# Patient Record
Sex: FEMALE | Race: WHITE | NOT HISPANIC OR LATINO | Employment: FULL TIME | URBAN - METROPOLITAN AREA
[De-identification: names, ages, dates, MRNs, and addresses within clinical notes are randomized per-mention and may not be internally consistent; named-entity substitution may affect disease eponyms.]

---

## 2019-02-04 ENCOUNTER — OFFICE VISIT - HEALTHEAST (OUTPATIENT)
Dept: FAMILY MEDICINE | Facility: CLINIC | Age: 24
End: 2019-02-04

## 2019-02-04 DIAGNOSIS — Z30.09 ENCOUNTER FOR COUNSELING REGARDING CONTRACEPTION: ICD-10-CM

## 2019-02-04 DIAGNOSIS — H61.21 IMPACTED CERUMEN OF RIGHT EAR: ICD-10-CM

## 2019-02-04 DIAGNOSIS — J01.00 ACUTE NON-RECURRENT MAXILLARY SINUSITIS: ICD-10-CM

## 2019-02-04 DIAGNOSIS — H66.001 ACUTE SUPPURATIVE OTITIS MEDIA OF RIGHT EAR WITHOUT SPONTANEOUS RUPTURE OF TYMPANIC MEMBRANE, RECURRENCE NOT SPECIFIED: ICD-10-CM

## 2019-02-04 ASSESSMENT — MIFFLIN-ST. JEOR: SCORE: 1570.97

## 2019-02-05 LAB
C TRACH DNA SPEC QL PROBE+SIG AMP: NEGATIVE
N GONORRHOEA DNA SPEC QL NAA+PROBE: NEGATIVE

## 2019-02-06 ENCOUNTER — COMMUNICATION - HEALTHEAST (OUTPATIENT)
Dept: FAMILY MEDICINE | Facility: CLINIC | Age: 24
End: 2019-02-06

## 2019-02-19 ENCOUNTER — COMMUNICATION - HEALTHEAST (OUTPATIENT)
Dept: FAMILY MEDICINE | Facility: CLINIC | Age: 24
End: 2019-02-19

## 2019-02-25 ENCOUNTER — OFFICE VISIT - HEALTHEAST (OUTPATIENT)
Dept: FAMILY MEDICINE | Facility: CLINIC | Age: 24
End: 2019-02-25

## 2019-02-25 DIAGNOSIS — Z30.09 GENERAL COUNSELING FOR PRESCRIPTION OF ORAL CONTRACEPTIVES: ICD-10-CM

## 2019-02-25 DIAGNOSIS — Z12.4 SCREENING FOR MALIGNANT NEOPLASM OF CERVIX: ICD-10-CM

## 2019-02-25 DIAGNOSIS — Z30.430 ENCOUNTER FOR IUD INSERTION: ICD-10-CM

## 2019-02-25 LAB — HCG UR QL: NEGATIVE

## 2019-03-01 LAB
BKR LAB AP ABNORMAL BLEEDING: NO
BKR LAB AP BIRTH CONTROL/HORMONES: ABNORMAL
BKR LAB AP CERVICAL APPEARANCE: NORMAL
BKR LAB AP GYN ADEQUACY: ABNORMAL
BKR LAB AP GYN INTERPRETATION: ABNORMAL
BKR LAB AP HPV REFLEX: ABNORMAL
BKR LAB AP LMP: ABNORMAL
BKR LAB AP PATIENT STATUS: ABNORMAL
BKR LAB AP PREVIOUS ABNORMAL: ABNORMAL
BKR LAB AP PREVIOUS NORMAL: ABNORMAL
HIGH RISK?: NO
HPV SOURCE: ABNORMAL
HUMAN PAPILLOMA VIRUS 16 DNA: NEGATIVE
HUMAN PAPILLOMA VIRUS 18 DNA: NEGATIVE
HUMAN PAPILLOMA VIRUS FINAL DIAGNOSIS: ABNORMAL
HUMAN PAPILLOMA VIRUS OTHER HR: POSITIVE
INTERPRETING LAB: ABNORMAL
PATH REPORT.COMMENTS IMP SPEC: ABNORMAL
RESULT FLAG (HE HISTORICAL CONVERSION): ABNORMAL
SPECIMEN DESCRIPTION: ABNORMAL

## 2019-03-27 ENCOUNTER — COMMUNICATION - HEALTHEAST (OUTPATIENT)
Dept: FAMILY MEDICINE | Facility: CLINIC | Age: 24
End: 2019-03-27

## 2019-04-03 ENCOUNTER — OFFICE VISIT - HEALTHEAST (OUTPATIENT)
Dept: MIDWIFE SERVICES | Facility: CLINIC | Age: 24
End: 2019-04-03

## 2019-04-03 DIAGNOSIS — Z01.812 PRE-PROCEDURE LAB EXAM: ICD-10-CM

## 2019-04-03 DIAGNOSIS — Z30.017 NEXPLANON INSERTION: ICD-10-CM

## 2019-04-03 LAB — HCG UR QL: NEGATIVE

## 2019-04-03 ASSESSMENT — MIFFLIN-ST. JEOR: SCORE: 1569.61

## 2019-09-16 ENCOUNTER — OFFICE VISIT - HEALTHEAST (OUTPATIENT)
Dept: MIDWIFE SERVICES | Facility: CLINIC | Age: 24
End: 2019-09-16

## 2019-09-16 DIAGNOSIS — Z97.5 NEXPLANON IN PLACE: ICD-10-CM

## 2019-09-16 DIAGNOSIS — N92.1 BREAKTHROUGH BLEEDING ON NEXPLANON: ICD-10-CM

## 2019-09-16 DIAGNOSIS — Z97.5 BREAKTHROUGH BLEEDING ON NEXPLANON: ICD-10-CM

## 2019-09-16 ASSESSMENT — MIFFLIN-ST. JEOR: SCORE: 1546.93

## 2020-08-21 ENCOUNTER — COMMUNICATION - HEALTHEAST (OUTPATIENT)
Dept: FAMILY MEDICINE | Facility: CLINIC | Age: 25
End: 2020-08-21

## 2021-05-25 ENCOUNTER — RECORDS - HEALTHEAST (OUTPATIENT)
Dept: ADMINISTRATIVE | Facility: CLINIC | Age: 26
End: 2021-05-25

## 2021-05-27 NOTE — TELEPHONE ENCOUNTER
New Appointment Needed  What is the reason for the visit:    nexplanon insertion  Provider Preference: Dr Amada Euceda  How soon do you need to be seen?: patient is home from college and only has availability on Friday 3-29-19  Waitlist offered?: No  Okay to leave a detailed message:  Yes, it is okay to leave a detailed message with appointment time if able to be seen on Friday.

## 2021-05-27 NOTE — PATIENT INSTRUCTIONS - HE
Patient Education   If you have spotting, you can try ibuprofen 600 mg x5 days (or days you have spotting or bleeding)    Expect spotting or irregular bleeding for the first 3 months. You can use the above regimen to help try to reduce spotting/bleeding.     If you have bleeding beyond the 3 months and are interested in other options to control the spotting/bleeding, please call us at [phone] to make an appointment to come in to discuss.      Etonogestrel Implant  What is this medicine?  ETONOGESTREL (et oh ghazal KRYSTEN trel) is a contraceptive (birth control) device. It is used to prevent pregnancy. It can be used for up to 3 years.  This medicine may be used for other purposes; ask your health care provider or pharmacist if you have questions.  What should I tell my health care provider before I take this medicine?  They need to know if you have any of these conditions:    abnormal vaginal bleeding    blood vessel disease or blood clots    cancer of the breast, cervix, or liver    depression    diabetes    gallbladder disease    headaches    heart disease or recent heart attack    high blood pressure    high cholesterol    kidney disease    liver disease    renal disease    seizures    tobacco smoker    an unusual or allergic reaction to etonogestrel, other hormones, anesthetics or antiseptics, medicines, foods, dyes, or preservatives    pregnant or trying to get pregnant    breast-feeding  How should I use this medicine?  This device is inserted just under the skin on the inner side of your upper arm by a health care professional.  Talk to your pediatrician regarding the use of this medicine in children. Special care may be needed.  Overdosage: If you think you've taken too much of this medicine contact a poison control center or emergency room at once.  NOTE: This medicine is only for you. Do not share this medicine with others.  What if I miss a dose?  This does not apply.  What may interact with this  medicine?  Do not take this medicine with any of the following medications:    amprenavir    bosentan    fosamprenavir  This medicine may also interact with the following medications:    barbiturate medicines for inducing sleep or treating seizures    certain medicines for fungal infections like ketoconazole and itraconazole    griseofulvin    medicines to treat seizures like carbamazepine, felbamate, oxcarbazepine, phenytoin, topiramate    modafinil    phenylbutazone    rifampin    some medicines to treat HIV infection like atazanavir, indinavir, lopinavir, nelfinavir, tipranavir, ritonavir    [place]'s wort  This list may not describe all possible interactions. Give your health care provider a list of all the medicines, herbs, non-prescription drugs, or dietary supplements you use. Also tell them if you smoke, drink alcohol, or use illegal drugs. Some items may interact with your medicine.  What should I watch for while using this medicine?  This product does not protect you against HIV infection (AIDS) or other sexually transmitted diseases.  You should be able to feel the implant by pressing your fingertips over the skin where it was inserted. Tell your doctor if you cannot feel the implant.  What side effects may I notice from receiving this medicine?  Side effects that you should report to your doctor or health care professional as soon as possible:    allergic reactions like skin rash, itching or hives, swelling of the face, lips, or tongue    breast lumps    changes in vision    confusion, trouble speaking or understanding    dark urine    depressed mood    general ill feeling or flu-like symptoms    light-colored stools    loss of appetite, nausea    right upper belly pain    severe headaches    severe pain, swelling, or tenderness in the abdomen    shortness of breath, chest pain, swelling in a leg    signs of pregnancy    sudden numbness or weakness of the face, arm or leg    trouble walking, dizziness,  loss of balance or coordination    unusual vaginal bleeding, discharge    unusually weak or tired    yellowing of the eyes or skin  Side effects that usually do not require medical attention (Report these to your doctor or health care professional if they continue or are bothersome.):    acne    breast pain    changes in weight    cough    fever or chills    headache    irregular menstrual bleeding    itching, burning, and vaginal discharge    pain or difficulty passing urine    sore throat  This list may not describe all possible side effects. Call your doctor for medical advice about side effects. You may report side effects to FDA at 9-509-FDA-0062.  Where should I keep my medicine?  This drug is given in a hospital or clinic and will not be stored at home.  NOTE: This sheet is a summary. It may not cover all possible information. If you have questions about this medicine, talk to your doctor, pharmacist, or health care provider.  NOTE:This sheet is a summary. It may not cover all possible information. If you have questions about this medicine, talk to your doctor, pharmacist, or health care provider. Copyright  2015 Gold Standard

## 2021-05-27 NOTE — TELEPHONE ENCOUNTER
Reason contacted:  To help schedule a Nexplanon insertion    Information relayed:  Notified Pt that there is no opening with Dr. Euceda for tomorrow or next Friday for a Nexplanon insertion. Pt states she will try again next time.  Offered to schedule her to be seen elsewhere, no openings at other clinics close enough to home ([place]).    Additional questions:  No  Further follow-up needed:  No  Okay to leave a detailed message:  Yes

## 2021-05-30 ENCOUNTER — RECORDS - HEALTHEAST (OUTPATIENT)
Dept: ADMINISTRATIVE | Facility: CLINIC | Age: 26
End: 2021-05-30

## 2021-06-01 NOTE — PROGRESS NOTES
"Assessment:    24 y.o., birth control consult, Nexplanon in place X 5 months  Irregular bleeding    Plan:   -Due to history of migraines with aura, would not recommend RUTH's to help control irregular bleeding.    -Discussed option for oral ibuprofen.  Patient declines at this time stating she already tried that.  -She was given information pamphlet on Mirena IUD and [employer] web site to review birth control options.   -Return to clinic for further birth control needs or with any other concerns.    TT with patient 20 mns, >50% time spent in counseling or coordination of care.   RHONDA Finney    Patient was seen with student who was present for learning.  I personally assessed, examined and made clinical decisions reflected in the documentation. Iveth Pascual, SHARRON,CNM      Subjective:   Pt. presents today with her mother, with complaints of frequent menstrual bleeding following Nexplanon insertion in April 2019.  She has experienced bleeding every 1-2 weeks of moderate flow lasting 4 days.  She reports taking ibuprofen last to try to control bleeding, but did not have any improvements.  Patient presents with expectation that she could get a trial of combined birth control pills to help control the bleeding as discussed with a previous midwife.  No history of hypertension, blood clots, but does have a history of migraine headaches with aura when younger in [employer] and high school during which she missed several weeks of school days. Previously taken RUTH in high school for acne.     States she had a traumatic IUD placement attempt.  Also does not like injections so is not interested in Depo.      Past Medical History:   Diagnosis Date     Migraine 2011    Reports severe migraines with aura. Neuro eval done at [facility]         Objective:   /62 (Patient Site: Right Arm, Patient Position: Sitting, Cuff Size: Adult Regular)   Pulse 84   Ht 5' 8.6\" (1.742 m)   Wt 164 lb (74.4 kg)   " Breastfeeding? No   BMI 24.50 kg/m        No exam.

## 2021-06-02 VITALS — WEIGHT: 169.3 LBS | BODY MASS INDEX: 25.07 KG/M2 | HEIGHT: 69 IN

## 2021-06-02 VITALS — HEIGHT: 69 IN | WEIGHT: 169 LBS | BODY MASS INDEX: 25.03 KG/M2

## 2021-06-02 VITALS — WEIGHT: 169.3 LBS | BODY MASS INDEX: 25.29 KG/M2

## 2021-06-03 VITALS
DIASTOLIC BLOOD PRESSURE: 62 MMHG | SYSTOLIC BLOOD PRESSURE: 108 MMHG | HEIGHT: 69 IN | HEART RATE: 84 BPM | WEIGHT: 164 LBS | BODY MASS INDEX: 24.29 KG/M2

## 2021-06-10 NOTE — TELEPHONE ENCOUNTER
I have not seen patient in over 1 year. Needs appointment, but she needs to be aware there is unlikely much that can be done within one day, [place] if she still has nexplanon in place.  If she wants, should assist her in scheduling VV

## 2021-06-10 NOTE — TELEPHONE ENCOUNTER
Medication Request  Medication name: medication to stop the patient's period  Requested Pharmacy: [facility]  Reason for request: Patient stated she is getting  tomorrow and just got her period. Patient stated she would like to get a medication to stop her period on her wedding weekend. Patient stated she is on the Nexplanon and does still get her period.  When did you use medication last?:  n/a  Patient offered appointment:    Okay to leave a detailed message: yes [phone]

## 2021-06-16 PROBLEM — Z97.5 NEXPLANON IN PLACE: Status: ACTIVE | Noted: 2019-04-03

## 2021-06-16 PROBLEM — N92.1 BREAKTHROUGH BLEEDING ON NEXPLANON: Status: ACTIVE | Noted: 2019-09-16

## 2021-06-16 PROBLEM — Z97.5 BREAKTHROUGH BLEEDING ON NEXPLANON: Status: ACTIVE | Noted: 2019-09-16

## 2021-06-23 NOTE — TELEPHONE ENCOUNTER
Patient Returning Call  Reason for call:  Patient returning call  Information relayed to patient:  Relayed message below.  Patient states the she patient will be home from [facility] on that scheduled day and since Dr. Cloud will not be available, it is OK to see Dr. Euceda.  Patient has additional questions:  No  If YES, what are your questions/concerns:  N/A  Okay to leave a detailed message?: No call back needed

## 2021-06-23 NOTE — PROGRESS NOTES
"Assessment / Impression     1. Acute suppurative otitis media of right ear without spontaneous rupture of tympanic membrane, recurrence not specified     2. Acute non-recurrent maxillary sinusitis     3. Impacted cerumen of right ear     4. Encounter for counseling regarding contraception  Chlamydia trachomatis & Neisseria gonorrhoeae, Amplified Detection           Plan:     I personally removed cerumen of the right ear with a curette, then was able to visualize findings of acute otitis media as described below.  I discussed findings on physical exam with patient.  Given findings on exam as well as history, recommend we treat for acute sinusitis and otitis media.  She does have an amoxicillin allergy, therefore will treat with 10-day course of doxycycline.  Discussed possible side effects of medication, recommend she avoid sun exposure while taking medication.    Counseled patient regarding various contraception options, including pill, NuvaRing.  Patient was most interested in IUD. Discussed with patient various types of IUDs, including copper nonhormonal IUD as well as different types of hormonal IUD.  Discussed with patient benefits and risks of IUD insertion procedure, including, but not limited to bleeding, infection, uterine perforation.  Counseled patient regarding checking IUD strings, advised patient nothing in the vagina for 5 days after IUD placement.  We discussed the use of misoprostol for cervical ripening, which patient would like to take prior to procedure.  Discussed possible side effects of medication, counseled patient on use.  Also advised her to take ibuprofen 600 mg 1 hour prior to procedure.    -STD Screening for gonorrhea and chlamydia will be done today.  She will schedule IUD insertion appointment for the next few weeks.  -She plans on either Mirena or April/Kyleena.      Subjective:      HPI: Alexa Brewer is a 24 y.o. female who presents for \"I've been sick for a month.\" First started " "with fever of 102F.  Fever only lasted 1 day.  Since then, has had cough and nasal congestion.  When she blows nose, feels ears pop, which causes ear pain bilaterally.  Throat sore from coughing.  Has had difficultly sleeping due to cough. Has mucus with cough.  No shortness of breath, difficulty breathing.  Symptoms staying about the same: some days worse, some days better.  Has used cough drops and \"cough syrup supressant\" that helped a little.  [place]ent [place] 1 [place] ago, got sick 2 days prior.  Boyfriend has 2 kids, sick at the same time.  No fever other than the first day.      Patient is also interested in restarting birth control, she had previously been on birth control pills, though she is open to other options such as IUD.  She is currently sexually active, in a monogamous relationship.          Medical History:     Patient Active Problem List   Diagnosis     Dysmenorrhea     Headache     Allergy To Antibiotic Agents Penicillins     Migraine Headache     Alopecia     Chronic Sinusitis     Allergic Rhinitis     Allergies     Learning Disability - Reading       History reviewed. No pertinent past medical history.    Past Surgical History:   Procedure Laterality Date     broken arm  2001     TONSILLECTOMY AND ADENOIDECTOMY       WISDOM TOOTH EXTRACTION         Current Medications:     Current Outpatient Medications   Medication Sig     doxycycline (VIBRA-TABS) 100 MG tablet Take 1 tablet (100 mg total) by mouth 2 (two) times a day for 10 days.     miSOPROStol (CYTOTEC) 200 MCG tablet Take 2 tablets (400 mcg total) by mouth once for 1 dose. Take at least 2 hours prior to procedure     norgestimate-ethinyl estradiol (MONONESSA, 28,) 0.25-35 mg-mcg per tablet Take 1 tablet by mouth daily.       Family History:     Family History   Problem Relation Age of Onset     Crohn's disease Mother      Irritable bowel syndrome Mother      Hypothyroidism Mother      Thyroid nodules Mother      No Medical " "Problems Father      Irritable bowel syndrome Maternal Grandmother      Crohn's disease Maternal Grandmother      Hypothyroidism Maternal Grandmother        Review of Systems  All other systems reviewed and are negative.         Social History:     Social History     Tobacco Use   Smoking Status Never Smoker   Smokeless Tobacco Never Used     Social History     Social History Narrative    College student at [employer], [place].           Objective:     /76 (Patient Site: Left Arm, Patient Position: Sitting, Cuff Size: Adult Large)   Pulse 60   Temp 98.8  F (37.1  C) (Oral)   Ht 5' 8.6\" (1.742 m)   Wt 169 lb 4.8 oz (76.8 kg)   LMP 01/12/2019 (Exact Date)   Breastfeeding? No   BMI 25.29 kg/m    Physical Examination: General appearance - alert, well appearing, and in no distress  Eyes: pupils equal and reactive, extraocular eye movements intact  Ears: normal external ears, right ear canal initially with moderate amount of cerumen, though after I personally removed with curette, there is purulent fluid noted behind right TM.   Left TM appears normal, with no redness or bulging noted.  Right TM appears normal, with no redness or bulging noted.  Mouth: mucous membranes moist, pharynx normal without lesions  Sinus: tenderness to palpation over maxillary sinuses bilaterally.    Neck: supple, no significant adenopathy or thyromegaly  Lungs: clear to auscultation, no wheezes, rales or rhonchi, symmetric air entry  Heart: normal rate, regular rhythm, normal S1, S2, no murmurs.  Neurological: alert, oriented, normal speech, no focal findings or movement disorder noted.    Extremities: No edema, no clubbing or cyanosis  Psychiatric: Normal affect. Does not appear anxious or depressed.    No results found for this or any previous visit (from the past 168 hour(s)).      Tammy Cloud MD  2/4/2019  3:31 PM        "

## 2021-06-23 NOTE — TELEPHONE ENCOUNTER
Left message to call back for: Alexa  Information to relay to patient:  LMTCB. Calling to see if pt would like her IUD placed with Dr. Cloud. She is a Dr. Cloud patient and had her consult with her. Please reschedule with Dr. Cloud if that is what patient would like.

## 2021-06-24 NOTE — TELEPHONE ENCOUNTER
Upcoming Appointment Question  When is the appointment: 2/25/2019 with Dr Euceda  What is your appointment for?: IUD placement   Who is your appointment scheduled with?: Dr Euceda  What is your question/concern?: Mother is calling to see if Dr Cloud knows if Dr Euceda has done many IUD insertions as her daughter if very nervous about this appointment.  Please call and advise.   Okay to leave a detailed message?: Yes

## 2021-06-24 NOTE — PROGRESS NOTES
"  Assessment/Plan:       1. General counseling for prescription of oral contraceptives  After discussion of risks and benefits of multiple contraceptive methods, patient wishes to try an oral contraceptive pill again.  She was accepting of brochures of information for Nexplanon arm implant and NuvaRing vaginal inserts.  She will consider her options.  - norgestimate-ethinyl estradiol (MONONESSA, 28,) 0.25-35 mg-mcg per tablet; Take 1 tablet by mouth daily.  Dispense: 3 Package; Refill: 3    2. Encounter for IUD insertion  Patient was prepped and draped in the usual fashion and placed in the dorsal lithotomy position.  She had a moderate amount of discomfort with just speculum placement.  Her cervix is off to her right side.  Upon placement of the tenaculum, patient had more significant discomfort and wished to abort the procedure at that time.  IUD was not inserted today.  - Pregnancy (Beta-hCG, Qual), Urine    3. Screening for malignant neoplasm of cervix  Routine Pap smear performed prior to attempted IUD insertion.  - Gynecologic Cytology (PAP Smear)        Subjective:       Alexa Brewer is a 24 y.o. female who presents for IUD insertion.  She is unsure whether she would like Kyleena or Mirena IUD.  She is currently sexually active with one male partner.  She has no concern for sexually transmitted infections, recently had negative gonorrhea and Chlamydia testing performed.  She desires IUD for contraceptive purposes.  She previously took an oral contraceptive pill, but states that she was \"very young\", and had trouble remembering to take this daily.  She states that she has never had a pelvic exam or Pap smear performed.    The following portions of the patient's history were reviewed and updated as appropriate: allergies, current medications, past medical history, past social history and problem list.      Current Outpatient Medications:      norgestimate-ethinyl estradiol (MONONESSA, 28,) 0.25-35 mg-mcg " per tablet, Take 1 tablet by mouth daily., Disp: 3 Package, Rfl: 3    Review of Systems   Pertinent items are noted in HPI.      Objective:      /66 (Patient Site: Right Arm, Patient Position: Sitting, Cuff Size: Adult Regular)   Pulse 100   Wt 169 lb 4.8 oz (76.8 kg)   LMP 02/11/2019   Breastfeeding? No   BMI 25.29 kg/m      General appearance: alert, appears stated age and cooperative  Pelvic: cervix normal in appearance, external genitalia normal, no adnexal masses or tenderness, no cervical motion tenderness, rectovaginal septum normal, uterus normal size, shape, and consistency, vagina normal without discharge and cervix to patient's right side  Psychiatric: Patient is extremely nervous for today's examination        Results for orders placed or performed in visit on 02/25/19   Pregnancy (Beta-hCG, Qual), Urine   Result Value Ref Range    Pregnancy Test, Urine Negative Negative

## 2021-06-24 NOTE — TELEPHONE ENCOUNTER
Who is calling:  Mother   Reason for Call:  Mother states after doing research she wanted to cancel the message.   Date of last appointment with primary care: 2/4/19  Has the patient been recently seen:  Yes  Okay to leave a detailed message: Yes

## 2021-06-24 NOTE — TELEPHONE ENCOUNTER
Spoke with wander to let her know that Dr. Euceda has done many IUD placements.  To not be concerned.  Graciela Baca, CMA

## 2021-06-30 NOTE — PROGRESS NOTES
"  HPI: Patient here with her mother for Nexplanon insertion. She desires Nexplanon insertion for contraception. She states she had attempted an IUD insertion and \"it didn't go well\" and notes it was painful and now desires Nexplanon for birth control. Will use condoms for STI prevention. Urine pregnancy test was done and result was negative.      An informed verbal consent was taken prior to insertion. Risks of the procedure include: bleeding, infection, difficulty with removal, scarring and nerve damage. There may be bruising at the site of incision and down the arm.    Patient's last menstrual period was 03/12/2019.     Nexplanon Insertion Procedure Note    Pre-operative Diagnosis: desires Nexplanon for contraception    Post-operative Diagnosis: same    Indications: contraception    Procedure Details   Urine pregnancy test was done and result was negative.       NEXPLANON has been discussed with healthcare provider who answered all   questions. It was explained that there are benefits as well as risks with using NEXPLANON. The patient understands that there are other birth control methods and that each has its own benefits and risks.  She also understands that she needs to sign a consent form to show  that she is making an  informed and careful decision to use NEXPLANON, and that she has read and understood the following points.    NEXPLANON helps to keep her from getting pregnant.    No contraceptive method is 100% effective, including NEXPLANON.    NEXPLANON has an implant that contains a hormone.    It is important to have the NEXPLANON implant placed in the arm at the right time ofthe menstrual cycle.    After the implant is placed in my arm, the patient should check that it is in place by gently pressing her fingertips over the skin where the implant was placed. She should be able to feel the implant.    The implant must be removed at the end of three years. The implant can be removed sooner if  she wants it " "-- DO NOT REPLY / DO NOT REPLY ALL --  -- Message is from the [email or web address]--    COVID-19 Universal Screening: N/A - Not about scheduling    General Patient Message      Reason for Call: Pharmacy calling to let doctor know the 2 blood pressure medication prescribed are not covered by insurance and the pharmacy has offered the alternative. Please call back to review this. Caller Information       Type Contact Phone    06/30/2021 12:20 PM CDT Phone (Incoming) [address] [id number] - [place] (Pharmacy) [phone]          Alternative phone number: none    Turnaround time given to caller: ""This message will be sent to [facility] Provider's name]. The clinical team will fulfill your request as soon as they review your message. \""    " removed.    If she has trouble finding a healthcare provider to remove the implant, she can call [phone] for help.    The implant is placed under the skin of the arm during a procedure done in a healthcare provider s office. There is a slight risk of getting a scar or an infection from this procedure.    Removal is usually a minor procedure. Sometimes, removal may be more difficult. Special procedures, including surgery in the hospital, may be needed. Difficult removals may cause pain and scarring and may result in injury to nerves and blood vessels. If the implant is not removed, its effects may continue.    Most women have changes in their menstrual bleeding patterns while using NEXPLANON.  Bleeding may be irregular, lighter or heavier, or bleeding may completely stop. If the patient thinks she is pregnant, she should contact my healthcare provider as soon as possible.    the patient verbalizes her understanding of  the warning signs for problems with NEXPLANON. She has been advised to seek medical attention if any warning signs appear.    She should tell all her healthcare providers that she is using NEXPLANON.    The patient was advised to have a medical checkup regularly and at any time she is having problems.    NEXPLANON does not provide protection from HIV infection (AIDS) or any other sexually transmitted diseases.  After learning about NEXPLANON, the patient chose to use NEXPLANON.     The risks (including infection, bleeding, pain) and benefits of the procedure were explained to the patient and Written informed consent was obtained.      Pt was positioned on exam table with left, non dominant arm next to her on the table. Insertion site was cleaned with alcohol prep at 9-10 cm from epicondyle. 1%Lidocaine inserted intradermally and Nexplanon was inserted without difficulty. Site was covered with band aid and a pressure dressing. Pt instructed not to lift heavy items with left arm for 24 hours,  Ibuprofen prn for pain or ice to site as need for bruising    Nexplanon Information:  Lot # [id number]  Exp date: See MAR.    Condition:  Stable    Complications:  None    Plan:    The patient was advised to call for any fever or for prolonged or severe pain or bleeding. She was advised to use OTC ibuprofen as needed for mild to moderate pain.     Total time of 20 minutes discussion of insertion and counseling and informed consent with an additional 5 minutes for insertion procedure.      SHARRON Whitaker, CUCO   4/3/2019 3:12 PM

## 2021-08-22 ENCOUNTER — HEALTH MAINTENANCE LETTER (OUTPATIENT)
Age: 26
End: 2021-08-22

## 2021-10-17 ENCOUNTER — HEALTH MAINTENANCE LETTER (OUTPATIENT)
Age: 26
End: 2021-10-17

## 2021-12-16 ENCOUNTER — OFFICE VISIT (OUTPATIENT)
Dept: MIDWIFE SERVICES | Facility: CLINIC | Age: 26
End: 2021-12-16
Payer: COMMERCIAL

## 2021-12-16 VITALS
DIASTOLIC BLOOD PRESSURE: 62 MMHG | HEIGHT: 69 IN | HEART RATE: 76 BPM | SYSTOLIC BLOOD PRESSURE: 108 MMHG | BODY MASS INDEX: 25.62 KG/M2 | WEIGHT: 173 LBS

## 2021-12-16 DIAGNOSIS — Z30.46 ENCOUNTER FOR REMOVAL AND REINSERTION OF NEXPLANON: Primary | ICD-10-CM

## 2021-12-16 DIAGNOSIS — Z97.5 NEXPLANON IN PLACE: ICD-10-CM

## 2021-12-16 PROCEDURE — 11983 REMOVE/INSERT DRUG IMPLANT: CPT | Performed by: ADVANCED PRACTICE MIDWIFE

## 2021-12-16 ASSESSMENT — MIFFLIN-ST. JEOR: SCORE: 1586.16

## 2021-12-16 NOTE — PROGRESS NOTES
Nexplanon Removal and Insertion Procedure Note    Pre-operative Diagnosis: desires Nexplanon removal and Nexplanon insertion.     Post-operative Diagnosis: same    Indications: contraception    Allergies:  No allergies to antiseptic (alcohol) or anesthetic (Lidocaine)   Nexplanon inserted 2019. Urine pregnancy test was not done as she has an active Nexplanon in place.     NEXPLANON has been discussed with healthcare provider who answered all   questions. It was explained that there are benefits as well as risks with using NEXPLANON. The patient understands that there are other birth control methods and that each has its own benefits and risks. She also understands that she needs to sign a consent form to show  that she is making an informed and careful decision to use NEXPLANON, and that she has read and understood the following points:      NEXPLANON helps to keep her from getting pregnant.    No contraceptive method is 100% effective, including NEXPLANON.    NEXPLANON has an implant that contains a hormone.    It is important to have the NEXPLANON implant placed in the arm at the right time ofthe menstrual cycle.    After the implant is placed in my arm, the patient should check that it is in place by gently pressing her fingertips over the skin where the implant was placed. She should be able to feel the implant.    The implant must be removed at the end of three years. The implant can be removed sooner if  she wants it removed.    If she has trouble finding a healthcare provider to remove the implant, she can call [phone] for help.    The implant is placed under the skin of the arm during a procedure done in a healthcare provider s office. There is a slight risk of getting a scar or an infection from this procedure.    Removal is usually a minor procedure. Sometimes, removal may be more difficult. Special procedures, including surgery in the hospital, may be needed. Difficult removals may cause pain and  scarring and may result in injury to nerves and blood vessels. If the implant is not removed, its effects may continue.    Most women have changes in their menstrual bleeding patterns while using NEXPLANON.  Bleeding may be irregular, lighter or heavier, or bleeding may completely stop. If the patient thinks she is pregnant, she should contact my healthcare provider as soon as possible.    the patient verbalizes her understanding of  the warning signs for problems with NEXPLANON. She has been advised to seek medical attention if any warning signs appear.    She should tell all her healthcare providers that she is using NEXPLANON.    The patient was advised to have a medical checkup regularly and at any time she is having problems.    NEXPLANON does not provide protection from HIV infection (AIDS) or any other sexually transmitted diseases.  After learning about NEXPLANON, the patient chose to use NEXPLANON.    -The risks (including infection, bleeding, pain) and benefits of the procedure were explained to the patient and Written informed consent was obtained.      Procedure Details:    Removal:  The risks (including infection, bleeding, pain) and benefits of the procedure were explained to the patient and verbal informed consent was obtained.     Pt was positioned on exam table with left arm next to her on the table. Insertion site was cleaned with alcohol prep at 7cm from elbow crease.  2mL 1% Lidocaine inserted intradermally and 0.5 cm incision made with #10 scalpel. Tip of Nexplanon was located and grasped with forceps, then removed without difficulty.     Insert measured and confirmed to be intact, 4cm in length.    Insertion:    -Pt was positioned on exam table with left, non dominant arm next to her on the table. Insertion site was identified (overlying the triceps muscle about 8-10 cm from the medial epicondyle of the humerus and 3-5 cm below the sulcus groove between the biceps and triceps muscles) and  cleaned with alcohol prep. 2mL of 1% Lidocaine inserted intradermally and Nexplanon was inserted without difficulty. Site was covered with band aid and a pressure dressing.     Nexplanon was inserted into same site  without difficulty. Site was covered with band aid and a pressure dressing.     Condition:  Stable but had lots of anxiety before and after     Complications:  None    Plan:  1.) She was advised to use OTC ibuprofen as needed for mild to moderate pain or ice to site as need for bruising.  2.) She was encouraged to keep pressure dressing on for 24 hours and to keep Band-Aid in place for 5 days.   3.) Pt instructed not to lift heavy items with left arm for 24 hours,   4.) The patient was advised to call for any fever or for prolonged or severe pain or bleeding, if she can't feel the device or if she thinks she is pregnant.   5.) ID card from Nexplanon box was completed and given to patient.   6.) Reminded Nexplanon does not protect against STIs so encouraged to use condoms with intercourse for STI prevention as needed.   7.) Nexplanon added to  MAR/ Implant section of chart  8.) Declines STI testing.     Melissa Garces CNM

## 2022-10-01 ENCOUNTER — HEALTH MAINTENANCE LETTER (OUTPATIENT)
Age: 27
End: 2022-10-01

## 2023-10-21 ENCOUNTER — HEALTH MAINTENANCE LETTER (OUTPATIENT)
Age: 28
End: 2023-10-21

## 2024-12-08 ENCOUNTER — HEALTH MAINTENANCE LETTER (OUTPATIENT)
Age: 29
End: 2024-12-08